# Patient Record
Sex: FEMALE | Race: WHITE | NOT HISPANIC OR LATINO | Employment: OTHER | URBAN - METROPOLITAN AREA
[De-identification: names, ages, dates, MRNs, and addresses within clinical notes are randomized per-mention and may not be internally consistent; named-entity substitution may affect disease eponyms.]

---

## 2017-12-13 ENCOUNTER — ESTABLISHED COMPREHENSIVE EXAM (OUTPATIENT)
Dept: URBAN - METROPOLITAN AREA CLINIC 36 | Facility: CLINIC | Age: 77
End: 2017-12-13

## 2017-12-13 DIAGNOSIS — H40.033: ICD-10-CM

## 2017-12-13 DIAGNOSIS — H04.123: ICD-10-CM

## 2017-12-13 DIAGNOSIS — H25.9: ICD-10-CM

## 2017-12-13 DIAGNOSIS — H00.012: ICD-10-CM

## 2017-12-13 PROCEDURE — 4004F PT TOBACCO SCREEN RCVD TLK: CPT

## 2017-12-13 PROCEDURE — G8427 DOCREV CUR MEDS BY ELIG CLIN: HCPCS

## 2017-12-13 PROCEDURE — 92014 COMPRE OPH EXAM EST PT 1/>: CPT

## 2017-12-13 ASSESSMENT — VISUAL ACUITY
OD_SC: 20/50-1
OS_SC: 20/40
OS_SC: J7
OS_CC: J1
OD_CC: J2
OD_CC: 20/30-1
OS_CC: 20/25
OD_SC: J7

## 2017-12-13 ASSESSMENT — TONOMETRY
OD_IOP_MMHG: 16
OS_IOP_MMHG: 16

## 2018-05-07 NOTE — PATIENT DISCUSSION
CATARACT, OU - EQUALLY VISUALLY SIGNIFICANT. VISUALLY BOTHERSOME TO PT OD&gt;OS. SCHEDULE SX OD THEN LATER IN OS IF VISUAL SYMPTOMS PERSIST.  GLS RX GIVEN TO FILL IF DESIRES IN THE EVENT PT DOES NOT PROCEED W/ SX.

## 2018-05-07 NOTE — PATIENT DISCUSSION
REFRACTIVE ERROR, OU - DISCUSSED OPTION OF CORRECTING AT THE TIME OF CATARACT SURGERY. PT UNDERSTANDS AND ELECTS TO CONSIDER HER OPTIONS.

## 2018-05-07 NOTE — PATIENT DISCUSSION
Surgery  Counseling: I have discussed the option of glasses versus cataract surgery versus following. It was explained that when vision no longer meets the patient's visual needs and a new prescription for glasses is not likely to improve the patient's visual symptoms, the option of cataract surgery is a reasonable next step. It was explained that there is no guarantee that removing the cataract will improve their visual symptoms, however; it is believed that the cataract is contributing to the patient's visual impairment and surgery may significantly improve both the visual and functional status of the patient. The risks, benefits and alternatives of surgery were discussed with the patient. After this discussion, the patient desires to proceed with cataract surgery with implantation of an intraocular lens to improve vision for reading street signs, playing golf, and to reduce glare.

## 2018-06-07 NOTE — PATIENT DISCUSSION
***This patient had traditional cataract surgery performed. A monofocal IOL was placed to achieve a target refraction of plano (which should provide them with satisfactory distance vision with aid of glasses or contact lenses). ***

## 2018-06-07 NOTE — PATIENT DISCUSSION
Surgery  Counseling: I have discussed the option of glasses versus cataract surgery versus following . It was explained that when vision no longer meets the patient's visual needs and a new prescription for glasses is not likely to improve all of the patient's visual symptoms, the option of cataract surgery is a reasonable next step. It was explained that there is no guarantee that removing the cataract will improve their visual symptoms, however; it is believed that the cataract is contributing to the patient's visual impairment and surgery may significantly improve both the visual and functional status of the patient. The risks, benefits and alternatives of surgery were discussed with the patient. After this discussion, the patient desires to proceed with cataract surgery with implantation of an intraocular lens to improve vision for driving and watching television.

## 2018-06-14 NOTE — PATIENT DISCUSSION
***This patient had traditional  cataract surgery performed. A monofocal  was placed to achieve a target refraction of plano__ (which should provide them with satisfactory  vision with the aid of glasses/contact lenses). ***

## 2018-12-12 ENCOUNTER — ESTABLISHED COMPREHENSIVE EXAM (OUTPATIENT)
Dept: URBAN - METROPOLITAN AREA CLINIC 36 | Facility: CLINIC | Age: 78
End: 2018-12-12

## 2018-12-12 DIAGNOSIS — H02.882: ICD-10-CM

## 2018-12-12 DIAGNOSIS — H25.9: ICD-10-CM

## 2018-12-12 DIAGNOSIS — H04.123: ICD-10-CM

## 2018-12-12 DIAGNOSIS — H40.033: ICD-10-CM

## 2018-12-12 DIAGNOSIS — H53.8: ICD-10-CM

## 2018-12-12 PROCEDURE — G8428 CUR MEDS NOT DOCUMENT: HCPCS

## 2018-12-12 PROCEDURE — 92014 COMPRE OPH EXAM EST PT 1/>: CPT

## 2018-12-12 PROCEDURE — G9902 PT SCRN TBCO AND ID AS USER: HCPCS

## 2018-12-12 PROCEDURE — G9906 PT RECV TBCO CESS INTERV: HCPCS

## 2018-12-12 PROCEDURE — 92015 DETERMINE REFRACTIVE STATE: CPT

## 2018-12-12 PROCEDURE — 4004F PT TOBACCO SCREEN RCVD TLK: CPT

## 2018-12-12 ASSESSMENT — VISUAL ACUITY
OD_CC: 20/20
OS_CC: J2
OD_SC: J12
OS_SC: 20/60
OS_SC: J12
OD_CC: J2
OD_SC: 20/70
OS_CC: 20/20

## 2018-12-12 ASSESSMENT — TONOMETRY
OD_IOP_MMHG: 17
OS_IOP_MMHG: 16

## 2019-12-12 ENCOUNTER — ESTABLISHED COMPREHENSIVE EXAM (OUTPATIENT)
Dept: URBAN - METROPOLITAN AREA CLINIC 36 | Facility: CLINIC | Age: 79
End: 2019-12-12

## 2019-12-12 DIAGNOSIS — H25.9: ICD-10-CM

## 2019-12-12 DIAGNOSIS — H04.123: ICD-10-CM

## 2019-12-12 DIAGNOSIS — H40.033: ICD-10-CM

## 2019-12-12 PROCEDURE — 92014 COMPRE OPH EXAM EST PT 1/>: CPT

## 2019-12-12 PROCEDURE — 92015 DETERMINE REFRACTIVE STATE: CPT

## 2019-12-12 ASSESSMENT — VISUAL ACUITY
OD_OTHER: FL-.25
OD_CC: J1
OD_BAT: 20/70-1
OS_SC: J12
OD_SC: 20/70-2
OS_BAT: 20/70
OD_CC: 20/25+3
OS_SC: 20/70-2
OS_CC: J1
OS_CC: 20/25-1
OD_SC: J12
OS_OTHER: FL+.25

## 2019-12-12 ASSESSMENT — TONOMETRY
OS_IOP_MMHG: 16
OD_IOP_MMHG: 15
OD_IOP_MMHG: 17
OS_IOP_MMHG: 15

## 2020-12-10 ENCOUNTER — ESTABLISHED COMPREHENSIVE EXAM (OUTPATIENT)
Dept: URBAN - METROPOLITAN AREA CLINIC 36 | Facility: CLINIC | Age: 80
End: 2020-12-10

## 2020-12-10 DIAGNOSIS — H02.882: ICD-10-CM

## 2020-12-10 DIAGNOSIS — H25.9: ICD-10-CM

## 2020-12-10 DIAGNOSIS — H52.7: ICD-10-CM

## 2020-12-10 DIAGNOSIS — H04.123: ICD-10-CM

## 2020-12-10 DIAGNOSIS — H40.033: ICD-10-CM

## 2020-12-10 PROCEDURE — 92014 COMPRE OPH EXAM EST PT 1/>: CPT

## 2020-12-10 ASSESSMENT — VISUAL ACUITY
OD_CC: 20/20
OS_SC: J12
OS_CC: J2
OS_CC: 20/20
OS_SC: 20/100
OD_SC: 20/70
OD_SC: J12
OD_CC: J1

## 2020-12-10 ASSESSMENT — TONOMETRY
OD_IOP_MMHG: 13
OS_IOP_MMHG: 13

## 2021-12-09 ENCOUNTER — COMPREHENSIVE EXAM (OUTPATIENT)
Dept: URBAN - METROPOLITAN AREA CLINIC 36 | Facility: CLINIC | Age: 81
End: 2021-12-09

## 2021-12-09 DIAGNOSIS — H25.811: ICD-10-CM

## 2021-12-09 DIAGNOSIS — H04.123: ICD-10-CM

## 2021-12-09 DIAGNOSIS — H52.7: ICD-10-CM

## 2021-12-09 DIAGNOSIS — H02.882: ICD-10-CM

## 2021-12-09 DIAGNOSIS — H50.52: ICD-10-CM

## 2021-12-09 DIAGNOSIS — H25.812: ICD-10-CM

## 2021-12-09 DIAGNOSIS — H40.033: ICD-10-CM

## 2021-12-09 PROCEDURE — 92014 COMPRE OPH EXAM EST PT 1/>: CPT

## 2021-12-09 PROCEDURE — 92015 DETERMINE REFRACTIVE STATE: CPT

## 2021-12-09 ASSESSMENT — VISUAL ACUITY
OS_CC: J3
OS_SC: 20/100-1
OS_SC: J12
OD_CC: 20/25
OS_CC: 20/25
OD_CC: J1
OD_SC: 20/100-1
OD_SC: J12

## 2021-12-09 ASSESSMENT — TONOMETRY
OD_IOP_MMHG: 12
OS_IOP_MMHG: 14
OS_IOP_MMHG: 12
OD_IOP_MMHG: 14

## 2022-12-15 ENCOUNTER — COMPREHENSIVE EXAM (OUTPATIENT)
Dept: URBAN - METROPOLITAN AREA CLINIC 36 | Facility: CLINIC | Age: 82
End: 2022-12-15

## 2022-12-15 PROCEDURE — 92014 COMPRE OPH EXAM EST PT 1/>: CPT

## 2022-12-15 ASSESSMENT — VISUAL ACUITY
OS_SC: >J10
OD_CC: 20/30-1
OD_SC: J10
OS_CC: J1
OD_CC: J1
OS_CC: 20/30
OS_SC: 20/80
OD_SC: 20/80-1

## 2022-12-15 ASSESSMENT — TONOMETRY
OD_IOP_MMHG: 15
OS_IOP_MMHG: 15

## 2022-12-16 ENCOUNTER — CONSULTATION/EVALUATION (OUTPATIENT)
Dept: URBAN - METROPOLITAN AREA CLINIC 36 | Facility: CLINIC | Age: 82
End: 2022-12-16

## 2022-12-16 PROCEDURE — 92004 COMPRE OPH EXAM NEW PT 1/>: CPT

## 2022-12-16 PROCEDURE — 76514 ECHO EXAM OF EYE THICKNESS: CPT

## 2022-12-16 PROCEDURE — 92132 CPTRZD OPH DX IMG ANT SGM: CPT

## 2022-12-16 PROCEDURE — 92020 GONIOSCOPY: CPT

## 2022-12-16 ASSESSMENT — VISUAL ACUITY
OD_CC: 20/25+2
OD_CC: J1
OD_SC: J12
OD_SC: 20/80-1
OS_SC: 20/80
OS_CC: J1
OS_CC: 20/30-2
OS_SC: < J12

## 2022-12-16 ASSESSMENT — TONOMETRY
OS_IOP_MMHG: 14
OD_IOP_MMHG: 13

## 2022-12-16 ASSESSMENT — PACHYMETRY
OD_CT_UM: 558
OS_CT_UM: 557

## 2023-11-15 NOTE — PATIENT DISCUSSION
Health Maintenance Due   Topic Date Due   • COVID-19 Vaccine (1) Never done   • Pneumococcal Vaccine 0-64 (1 - PPSV23) 04/09/2020   • Influenza Vaccine (1 of 2) Never done   • Annual Physical (ages 3-18)  11/14/2023       Patient is due for topics as listed above but is not proceeding with Immunization(s) COVID-19, Influenza and Pneumococcal at this time. DECLINE   S/P PC IOL, OS- DOING WELL. CONTINUE DROPS AS DIRECTED. FOLLOW UP WIT DR THOMPSON.

## 2023-12-19 ENCOUNTER — COMPREHENSIVE EXAM (OUTPATIENT)
Dept: URBAN - METROPOLITAN AREA CLINIC 36 | Facility: CLINIC | Age: 83
End: 2023-12-19

## 2023-12-19 DIAGNOSIS — H52.7: ICD-10-CM

## 2023-12-19 DIAGNOSIS — H25.813: ICD-10-CM

## 2023-12-19 DIAGNOSIS — H50.52: ICD-10-CM

## 2023-12-19 DIAGNOSIS — H40.023: ICD-10-CM

## 2023-12-19 DIAGNOSIS — H02.88A: ICD-10-CM

## 2023-12-19 DIAGNOSIS — H02.88B: ICD-10-CM

## 2023-12-19 DIAGNOSIS — H40.033: ICD-10-CM

## 2023-12-19 DIAGNOSIS — H04.123: ICD-10-CM

## 2023-12-19 PROCEDURE — 92015 DETERMINE REFRACTIVE STATE: CPT

## 2023-12-19 PROCEDURE — 92014 COMPRE OPH EXAM EST PT 1/>: CPT

## 2023-12-19 ASSESSMENT — VISUAL ACUITY
OS_CC: 20/25-1
OS_SC: J12
OD_SC: J>12
OS_SC: 20/80-1
OS_CC: J2
OD_CC: J1
OD_CC: 20/30
OD_SC: 20/50-1

## 2023-12-19 ASSESSMENT — TONOMETRY
OD_IOP_MMHG: 14
OS_IOP_MMHG: 14

## 2024-11-21 ENCOUNTER — COMPREHENSIVE EXAM (OUTPATIENT)
Dept: URBAN - METROPOLITAN AREA CLINIC 36 | Facility: CLINIC | Age: 84
End: 2024-11-21

## 2024-11-21 DIAGNOSIS — H02.88A: ICD-10-CM

## 2024-11-21 DIAGNOSIS — H40.033: ICD-10-CM

## 2024-11-21 DIAGNOSIS — H25.813: ICD-10-CM

## 2024-11-21 DIAGNOSIS — H02.88B: ICD-10-CM

## 2024-11-21 DIAGNOSIS — H40.023: ICD-10-CM

## 2024-11-21 DIAGNOSIS — H04.123: ICD-10-CM

## 2024-11-21 DIAGNOSIS — H52.7: ICD-10-CM

## 2024-11-21 DIAGNOSIS — H50.52: ICD-10-CM

## 2024-11-21 PROCEDURE — 92015 DETERMINE REFRACTIVE STATE: CPT

## 2024-11-21 PROCEDURE — 92014 COMPRE OPH EXAM EST PT 1/>: CPT
